# Patient Record
Sex: MALE | Race: WHITE | NOT HISPANIC OR LATINO | Employment: OTHER | URBAN - METROPOLITAN AREA
[De-identification: names, ages, dates, MRNs, and addresses within clinical notes are randomized per-mention and may not be internally consistent; named-entity substitution may affect disease eponyms.]

---

## 2017-01-17 ENCOUNTER — GENERIC CONVERSION - ENCOUNTER (OUTPATIENT)
Dept: OTHER | Facility: OTHER | Age: 62
End: 2017-01-17

## 2017-05-01 ENCOUNTER — ALLSCRIPTS OFFICE VISIT (OUTPATIENT)
Dept: OTHER | Facility: OTHER | Age: 62
End: 2017-05-01

## 2017-05-01 ENCOUNTER — LAB REQUISITION (OUTPATIENT)
Dept: LAB | Facility: HOSPITAL | Age: 62
End: 2017-05-01
Payer: COMMERCIAL

## 2017-05-01 DIAGNOSIS — Z12.5 ENCOUNTER FOR SCREENING FOR MALIGNANT NEOPLASM OF PROSTATE: ICD-10-CM

## 2017-05-01 LAB — PSA SERPL-MCNC: 0.9 NG/ML (ref 0–4)

## 2017-05-01 PROCEDURE — G0103 PSA SCREENING: HCPCS | Performed by: FAMILY MEDICINE

## 2017-05-03 ENCOUNTER — GENERIC CONVERSION - ENCOUNTER (OUTPATIENT)
Dept: OTHER | Facility: OTHER | Age: 62
End: 2017-05-03

## 2017-05-16 ENCOUNTER — GENERIC CONVERSION - ENCOUNTER (OUTPATIENT)
Dept: OTHER | Facility: OTHER | Age: 62
End: 2017-05-16

## 2018-01-11 NOTE — PROGRESS NOTES
Assessment    1  Preventative health care (V70 0) (Z00 00)   2  Encounter for screening for malignant neoplasm of prostate (V76 44) (Z12 5)    Plan  Encounter for screening for malignant neoplasm of prostate    · (1) PSA (SCREEN) (Dx V76 44 Screen for Prostate Cancer); Status:Active; Requested  for:01May2017;   Need for influenza vaccination    · Stop: Fluzone Quadrivalent Intramuscular Suspension    Discussion/Summary    Patient is a 50-year-old M  1   - well today  Reviewed health maintenance measures with him  He is up-to-date with colon cancer screening  He is also up-to-date with his immunizations  He states that he was in [place] 4 years ago and received all his vaccines  He was advised that he only would be due today for shingles vaccine  He was advised to check with his insurance  If acceptable, he may receive this vaccine in office  Follow-up in one year  Chief Complaint  Patient presents for Annual physical and blood work review  Patient brought blood results  History of Present Illness  , Adult Male: The patient is being seen for a health maintenance evaluation  The last health maintenance visit was 1 year(s) ago  Social History: Household members include spouse and adult children  He is   The patient has never smoked cigarettes  He reports drinking 2 drinks per day  He has never used illicit drugs  General Health: The patient's health since the last visit is described as good  He has regular dental visits  He denies vision problems  He denies hearing loss  Lifestyle:  He consumes a diverse and healthy diet  He has weight concerns  He exercises regularly  He does not use tobacco  He consumes alcohol  He denies drug use  Screening:      Review of Systems    Constitutional: not feeling poorly and not feeling tired  Eyes: no eyesight problems and no purulent discharge from the eyes  ENT: no nosebleeds and no nasal discharge     Cardiovascular: no chest pain and no palpitations  Respiratory: no cough and no shortness of breath during exertion  Gastrointestinal: no nausea and no diarrhea  Genitourinary: no dysuria and no nocturia  Musculoskeletal: no arthralgias and no myalgias  Integumentary: no rashes and no skin lesions  Neurological: no headache, no numbness and no dizziness  Psychiatric: no anxiety and no depression  Endocrine: no muscle weakness and no erectile dysfunction  Hematologic/Lymphatic: no tendency for easy bleeding and no tendency for easy bruising  Active Problems    1  Encounter for screening for malignant neoplasm of colon (V76 51) (Z12 11)   2  Essential thrombocytopenia (287 30) (D69 3)   3  Essential thrombocytosis (238 71) (D47 3)   4  Impacted cerumen of left ear (380 4) (H61 22)   5  Male erectile dysfunction (607 84) (N52 9)   6  Special screening examination for neoplasm of prostate (V76 44) (Z12 5)    Past Medical History    · History of Screening for lipoid disorders (F07 91) (R84 013)    Surgical History    · History of Cornea Transplant    Current Meds   1  Adult Aspirin EC Low Strength 81 MG Oral Tablet Delayed Release; TAKE 1 TABLET   DAILY; Therapy: [id number] to Recorded   2  Anagrelide HCl - 0 5 MG Oral Capsule; Take 1 capsule twice daily Recorded   3  Cialis 5 MG Oral Tablet; TAKE AS DIRECTED; Therapy: [id number] to (Last Rx:13Apr2016)  Requested for: 13Apr2016 Ordered   4  Fish Oil 1000 MG Oral Capsule; Therapy: 03AVX8983 to Recorded   5  Multivitamins Oral Capsule; Therapy: 63TZF6851 to Recorded   6  Probiata Oral Tablet; Therapy: 06INI5202 to Recorded    Allergies    1   No Known Drug Allergies    Vitals   Recorded: 54IXX9543 09:33AM   Temperature 95 8 F, Tympanic   Heart Rate 64   Pulse Quality Norm   Respiration Quality Norm   Respiration 14   Systolic 114, LUE, Sitting   Diastolic 70, LUE, Sitting   Height 5 ft 2 in   Weight 144 lb 6 08 oz   BMI Calculated 26 41   BSA Calculated 1 66   O2 Saturation 95, RA   Pain Scale 0     Physical Exam    Constitutional   General appearance: No acute distress, well appearing and well nourished  Head and Face   Head and face: Normal     Palpation of the face and sinuses: No sinus tenderness  Eyes   Conjunctiva and lids: No erythema, swelling or discharge  Pupils and irises: Equal, round, reactive to light  Ears, Nose, Mouth, and Throat   External inspection of ears and nose: Normal     Otoscopic examination: Tympanic membranes translucent with normal light reflex  Canals patent without erythema  Nasal mucosa, septum, and turbinates: Normal without edema or erythema  Lips, teeth, and gums: Normal, good dentition  Oropharynx: Normal with no erythema, edema, exudate or lesions  Neck   Neck: Supple, symmetric, trachea midline, no masses  Thyroid: Normal, no thyromegaly  Pulmonary   Respiratory effort: No increased work of breathing or signs of respiratory distress  Auscultation of lungs: Clear to auscultation  Cardiovascular   Auscultation of heart: Normal rate and rhythm, normal S1 and S2, no murmurs  Peripheral vascular exam: Normal     Examination of extremities for edema and/or varicosities: Normal     Abdomen   Abdomen: Non-tender, no masses  Liver and spleen: No hepatomegaly or splenomegaly  Lymphatic   Palpation of lymph nodes in neck: No lymphadenopathy  Palpation of lymph nodes in axillae: No lymphadenopathy  Musculoskeletal   Gait and station: Normal     Inspection/palpation of joints, bones, and muscles: Normal     Range of motion: Normal     Skin   Skin and subcutaneous tissue: Normal without rashes or lesions  Palpation of skin and subcutaneous tissue: Normal turgor  Neurologic   Cranial nerves: Cranial nerves 2-12 intact  Sensation: No sensory loss      Psychiatric   Judgment and insight: Normal     Orientation to person, place and time: Normal     Mood and affect: Normal        Results/Data  PHQ-9 Adult Depression Screening [id number] 09:39AM User, [facility]     Test Name Result Flag Reference   PHQ-9 Adult Depression Score 0     Over the last two weeks, how often have you been bothered by any of the following problems? Little interest or pleasure in doing things: Not at all - 0  Feeling down, depressed, or hopeless: Not at all - 0  Trouble falling or staying asleep, or sleeping too much: Not at all - 0  Feeling tired or having little energy: Not at all - 0  Poor appetite or over eating: Not at all - 0  Feeling bad about yourself - or that you are a failure or have let yourself or your family down: Not at all - 0  Trouble concentrating on things, such as reading the newspaper or watching television: Not at all - 0  Moving or speaking so slowly that other people could have noticed  Or the opposite -  being so fidgety or restless that you have been moving around a lot more than usual: Not at all - 0  Thoughts that you would be better off dead, or of hurting yourself in some way: Not at all - 0   PHQ-9 Adult Depression Screening Negative     PHQ-9 Difficulty Level Not difficult at all     PHQ-9 Severity No Depression         Health Management  Encounter for screening for malignant neoplasm of colon   COLONOSCOPY; every 5 years; Last [id number]; Next Due: 53UKB7823; Active    Signatures   Electronically signed by :  Mazin Tan DO; May  1 2017 10:10AM EST                       (Author)

## 2018-01-12 VITALS
HEART RATE: 64 BPM | OXYGEN SATURATION: 95 % | WEIGHT: 144.38 LBS | SYSTOLIC BLOOD PRESSURE: 122 MMHG | BODY MASS INDEX: 26.57 KG/M2 | DIASTOLIC BLOOD PRESSURE: 70 MMHG | TEMPERATURE: 95.8 F | HEIGHT: 62 IN | RESPIRATION RATE: 14 BRPM

## 2018-01-15 NOTE — RESULT NOTES
Verified Results  (1) PSA (SCREEN) (Dx V76 44 Screen for Prostate Cancer) [id number] 10:13AM Ata Haelodiaing Order Number: [id number]     Test Name Result Flag Reference   PROSTATE SPECIFIC ANTIGEN 0 9 ng/mL  0 0-4 0   American [employer] Association Guidelines define biochemical recurrence of prostate cancer as a detectable or rising PSA value post-radical prostatectomy that is greater than or equal to 0 2 ng/mL with a second confirmatory level of greater than or equal to 0 2 ng/mL

## 2018-03-10 DIAGNOSIS — N52.9 ERECTILE DYSFUNCTION, UNSPECIFIED ERECTILE DYSFUNCTION TYPE: ICD-10-CM

## 2018-03-10 DIAGNOSIS — D69.3 ESSENTIAL THROMBOCYTOPENIA (HCC): Primary | ICD-10-CM

## 2018-03-10 NOTE — TELEPHONE ENCOUNTER
Patient called asking for a refill on his Cialis but he is requesting  20 mg instead of 5 mg, if approved please sent to mail order  Thank you

## 2018-03-10 NOTE — TELEPHONE ENCOUNTER
Please call patient, these different doses are used for different medical problems  The if he would like to switch the dose, he must be seen and evaluated for a reason why  Please schedule him    Thank you

## 2018-03-12 RX ORDER — TADALAFIL 5 MG/1
TABLET ORAL
COMMUNITY
Start: 2014-07-29 | End: 2018-03-12 | Stop reason: SDUPTHER

## 2018-03-12 RX ORDER — TADALAFIL 5 MG/1
5 TABLET ORAL DAILY
Qty: 90 TABLET | Refills: 3 | Status: SHIPPED | OUTPATIENT
Start: 2018-03-12 | End: 2019-05-06 | Stop reason: SDUPTHER

## 2018-03-12 NOTE — TELEPHONE ENCOUNTER
PT STATED THAT THE 5 MG IS FINE AND IF HE WANTS AN INCREASE HE WILL SCHEDULE AN APPT  PLEASE SEND THIS TO [facility]    HE STATED THAT THEY ALLOW 24 PILLS FOR A 90 DAY SUPPLY

## 2018-03-12 NOTE — TELEPHONE ENCOUNTER
Please check [employer] to confirm his previous dose of this medication  Please place order for this medication with proper quantity  I will authorize at that point   Thank you

## 2018-07-02 ENCOUNTER — OFFICE VISIT (OUTPATIENT)
Dept: FAMILY MEDICINE CLINIC | Facility: CLINIC | Age: 63
End: 2018-07-02
Payer: COMMERCIAL

## 2018-07-02 VITALS
RESPIRATION RATE: 16 BRPM | OXYGEN SATURATION: 97 % | TEMPERATURE: 96 F | WEIGHT: 146.7 LBS | SYSTOLIC BLOOD PRESSURE: 146 MMHG | HEART RATE: 85 BPM | DIASTOLIC BLOOD PRESSURE: 60 MMHG | BODY MASS INDEX: 27.7 KG/M2 | HEIGHT: 61 IN

## 2018-07-02 DIAGNOSIS — Z11.59 NEED FOR HEPATITIS C SCREENING TEST: ICD-10-CM

## 2018-07-02 DIAGNOSIS — Z00.00 PREVENTATIVE HEALTH CARE: Primary | ICD-10-CM

## 2018-07-02 DIAGNOSIS — Z13.220 SCREENING FOR CHOLESTEROL LEVEL: ICD-10-CM

## 2018-07-02 PROCEDURE — 99396 PREV VISIT EST AGE 40-64: CPT | Performed by: FAMILY MEDICINE

## 2018-07-02 RX ORDER — ANAGRELIDE 0.5 MG/1
1 CAPSULE ORAL 2 TIMES DAILY
COMMUNITY

## 2018-07-02 RX ORDER — ASPIRIN 81 MG/1
1 TABLET ORAL DAILY
COMMUNITY
Start: 2017-05-01

## 2018-07-02 RX ORDER — CHLORAL HYDRATE 500 MG
CAPSULE ORAL
COMMUNITY
Start: 2017-05-01

## 2018-07-02 NOTE — PROGRESS NOTES
Assessment/Plan:   1  Preventative health care  Patient appears well today  Reviewed health maintenance measures within  He is up-to-date with his colon cancer screening  He does have blood work checked regularly by his hematologist   Will check lipid panel as well as hepatitis C antibody  He was encouraged to continue with his regular exercise it appears that he is up-to-date with his vaccines  [facility]  He will attempt to request these records  Will follow up in 1 year or p r n  Joe Holleyuce - Lipid Panel with Direct LDL reflex; Future    2  Screening for cholesterol level  - Lipid Panel with Direct LDL reflex; Future    3  Need for hepatitis C screening test  - Hepatitis C antibody; Future     There are no diagnoses linked to this encounter  Subjective:    Chief Complaint   Patient presents with    Well Check        Patient ID: Maren Buitrago is a 60 y o  male  Maren Buitrago presents for health maintenance visit   60 y o   male    He/she states that  level of health is:  good     Dental issues :no    Vision issues: no    Hearing issues: no    Up-to-date on immunizations: no    Diet: good     Exercise:  daily    Tobacco: no    ETOH: Moderate    Illegal drugs: no          Review of Systems   Constitutional: Negative for activity change, chills, fatigue and fever  HENT: Negative for congestion, ear pain, sinus pressure and sore throat  Eyes: Negative for redness, itching and visual disturbance  Respiratory: Negative for cough and shortness of breath  Cardiovascular: Negative for chest pain and palpitations  Gastrointestinal: Negative for abdominal pain, diarrhea and nausea  Endocrine: Negative for cold intolerance and heat intolerance  Genitourinary: Negative for dysuria, flank pain and frequency  Musculoskeletal: Negative for arthralgias, back pain, gait problem and myalgias  Skin: Negative for color change     Allergic/Immunologic: Negative for environmental allergies  Neurological: Negative for dizziness, numbness and headaches  Psychiatric/Behavioral: Negative for behavioral problems and sleep disturbance  The following portions of the patient's history were reviewed and updated as appropriate : past family history, past medical history, past social history and past surgical history  Current Outpatient Prescriptions:     anagrelide (AGRYLIN) 0 5 MG capsule, Take 1 capsule by mouth 2 (two) times a day, Disp: , Rfl:     aspirin (ADULT ASPIRIN EC LOW STRENGTH) 81 mg EC tablet, Take 1 tablet by mouth daily, Disp: , Rfl:     Multiple Vitamin (MULTIVITAMINS PO), Take by mouth, Disp: , Rfl:     Omega-3 Fatty Acids (FISH OIL) 1,000 mg, Take by mouth, Disp: , Rfl:     tadalafil (CIALIS) 5 MG tablet, Take 1 tablet (5 mg total) by mouth daily, Disp: 90 tablet, Rfl: 3    Objective:    Vitals:    07/02/18 0924   BP: 146/60   BP Location: Left arm   Patient Position: Sitting   Cuff Size: Adult   Pulse: 85   Resp: 16   Temp: (!) 96 °F (35 6 °C)   TempSrc: Tympanic   SpO2: 97%   Weight: 66 5 kg (146 lb 11 2 oz)   Height: 5' 1" (1 549 m)        Physical Exam   Constitutional: He is oriented to person, place, and time  He appears well-developed and well-nourished  HENT:   Head: Normocephalic and atraumatic  Nose: Nose normal    Mouth/Throat: No oropharyngeal exudate  Eyes: Pupils are equal, round, and reactive to light  Right eye exhibits no discharge  Left eye exhibits no discharge  Neck: Normal range of motion  Neck supple  No tracheal deviation present  Cardiovascular: Normal rate, regular rhythm and intact distal pulses  Exam reveals no gallop and no friction rub  No murmur heard  Pulses:       Dorsalis pedis pulses are 2+ on the right side, and 2+ on the left side  Posterior tibial pulses are 2+ on the right side, and 2+ on the left side     Pulmonary/Chest: Effort normal and breath sounds normal  No respiratory distress  He has no wheezes  He has no rales  Abdominal: Soft  Bowel sounds are normal  He exhibits no distension  There is no tenderness  There is no rebound and no guarding  Musculoskeletal: Normal range of motion  He exhibits no edema  Lymphadenopathy:        Head (right side): No submental and no submandibular adenopathy present  Head (left side): No submental and no submandibular adenopathy present  He has no cervical adenopathy  Right cervical: No superficial cervical, no deep cervical and no posterior cervical adenopathy present  Left cervical: No superficial cervical, no deep cervical and no posterior cervical adenopathy present  Neurological: He is alert and oriented to person, place, and time  No cranial nerve deficit or sensory deficit  Skin: Skin is warm, dry and intact  Psychiatric: His speech is normal and behavior is normal  Judgment normal  His mood appears not anxious  Cognition and memory are normal  He does not exhibit a depressed mood  Vitals reviewed

## 2018-09-24 ENCOUNTER — TELEPHONE (OUTPATIENT)
Dept: FAMILY MEDICINE CLINIC | Facility: CLINIC | Age: 63
End: 2018-09-24

## 2018-09-24 NOTE — TELEPHONE ENCOUNTER
----- Message from Yelenahomer Jeffrey sent at 9/24/2018 12:55 PM EDT -----  Regarding: Visit Follow-Up Question  Contact: [phone]  Dr Shaun Aguilar wanted to follow up on a few things  I tried to [facility]  How was the LDL level  Generally my total is high but due to high HDL  Also while I was there I looked at last years PSA test  I was told is was very low but what I saw was 0 9  I thought above 0 2 was something to follow up on  Is the number correct  ??   Mode Francois

## 2018-09-26 NOTE — TELEPHONE ENCOUNTER
Please see result note  As far as his PSA, last was 0 9  Levels above 2 0 should be followed much more closely    Thank you

## 2018-11-03 ENCOUNTER — IMMUNIZATION (OUTPATIENT)
Dept: FAMILY MEDICINE CLINIC | Facility: CLINIC | Age: 63
End: 2018-11-03
Payer: COMMERCIAL

## 2018-11-03 DIAGNOSIS — Z23 ENCOUNTER FOR IMMUNIZATION: ICD-10-CM

## 2018-11-03 PROCEDURE — 90682 RIV4 VACC RECOMBINANT DNA IM: CPT

## 2018-11-03 PROCEDURE — 90471 IMMUNIZATION ADMIN: CPT

## 2019-04-16 ENCOUNTER — OFFICE VISIT (OUTPATIENT)
Dept: FAMILY MEDICINE CLINIC | Facility: CLINIC | Age: 64
End: 2019-04-16
Payer: COMMERCIAL

## 2019-04-16 VITALS
WEIGHT: 147 LBS | DIASTOLIC BLOOD PRESSURE: 76 MMHG | TEMPERATURE: 98.3 F | HEIGHT: 61 IN | BODY MASS INDEX: 27.75 KG/M2 | OXYGEN SATURATION: 98 % | SYSTOLIC BLOOD PRESSURE: 110 MMHG | HEART RATE: 98 BPM

## 2019-04-16 DIAGNOSIS — J01.90 ACUTE SINUSITIS, RECURRENCE NOT SPECIFIED, UNSPECIFIED LOCATION: Primary | ICD-10-CM

## 2019-04-16 PROCEDURE — 3008F BODY MASS INDEX DOCD: CPT | Performed by: FAMILY MEDICINE

## 2019-04-16 PROCEDURE — 99214 OFFICE O/P EST MOD 30 MIN: CPT | Performed by: FAMILY MEDICINE

## 2019-04-16 PROCEDURE — 69209 REMOVE IMPACTED EAR WAX UNI: CPT | Performed by: FAMILY MEDICINE

## 2019-04-16 RX ORDER — LEVOFLOXACIN 500 MG/1
500 TABLET, FILM COATED ORAL EVERY 24 HOURS
Qty: 7 TABLET | Refills: 0 | Status: SHIPPED | OUTPATIENT
Start: 2019-04-16 | End: 2019-04-23

## 2019-05-06 DIAGNOSIS — N52.9 ERECTILE DYSFUNCTION, UNSPECIFIED ERECTILE DYSFUNCTION TYPE: ICD-10-CM

## 2019-05-06 DIAGNOSIS — D69.3 ESSENTIAL THROMBOCYTOPENIA (HCC): ICD-10-CM

## 2019-05-07 RX ORDER — TADALAFIL 5 MG/1
TABLET ORAL
Qty: 24 TABLET | Refills: 12 | Status: SHIPPED | OUTPATIENT
Start: 2019-05-07

## 2020-09-02 ENCOUNTER — DOCTOR'S OFFICE (OUTPATIENT)
Dept: URBAN - METROPOLITAN AREA CLINIC 153 | Facility: CLINIC | Age: 65
Setting detail: OPHTHALMOLOGY
End: 2020-09-02
Payer: COMMERCIAL

## 2020-09-02 DIAGNOSIS — H52.4: ICD-10-CM

## 2020-09-02 DIAGNOSIS — Z94.7: ICD-10-CM

## 2020-09-02 DIAGNOSIS — H25.13: ICD-10-CM

## 2020-09-02 PROBLEM — H52.13 MYOPIA; BOTH EYES: Status: ACTIVE | Noted: 2020-09-02

## 2020-09-02 PROCEDURE — 92015 DETERMINE REFRACTIVE STATE: CPT | Performed by: OPTOMETRIST

## 2020-09-02 PROCEDURE — 92004 COMPRE OPH EXAM NEW PT 1/>: CPT | Performed by: OPTOMETRIST

## 2020-09-02 ASSESSMENT — REFRACTION_MANIFEST
OD_ADD: +2.25
OD_VA1: 20/150
OD_VA1: 20/70+2
OD_CYLINDER: -6.00
OD_AXIS: 095
OS_AXIS: 085
OS_CYLINDER: -0.75
OS_SPHERE: -6.00
OD_SPHERE: -5.75
OS_VA1: 20/25
OD_CYLINDER: SPH
OS_SPHERE: -6.00
OS_AXIS: 085
OS_ADD: +2.25
OD_SPHERE: -5.75
OU_VA: 20/25
OS_CYLINDER: -0.75
OS_VA1: 20/25

## 2020-09-02 ASSESSMENT — REFRACTION_CURRENTRX
OS_VPRISM_DIRECTION: SV
OD_OVR_VA: 20/
OS_CYLINDER: -1.00
OD_AXIS: 000
OS_ADD: +2.25
OD_VPRISM_DIRECTION: SV
OS_AXIS: 90
OS_OVR_VA: 20/
OD_CYLINDER: 0.00
OD_SPHERE: -5.75
OD_ADD: +2.25
OS_SPHERE: -6.00

## 2020-09-02 ASSESSMENT — REFRACTION_AUTOREFRACTION
OD_CYLINDER: -8.75
OS_CYLINDER: -0.75
OS_SPHERE: -6.00
OS_AXIS: 105
OD_SPHERE: -4.25
OD_AXIS: 095

## 2020-09-02 ASSESSMENT — CONFRONTATIONAL VISUAL FIELD TEST (CVF)
OS_FINDINGS: FULL
OD_FINDINGS: FULL

## 2020-09-02 ASSESSMENT — SPHEQUIV_DERIVED
OS_SPHEQUIV: -6.375
OD_SPHEQUIV: -8.625
OS_SPHEQUIV: -6.375
OS_SPHEQUIV: -6.375
OD_SPHEQUIV: -8.75

## 2020-09-02 ASSESSMENT — VISUAL ACUITY
OS_BCVA: 20/150
OD_BCVA: 20/30

## 2020-09-03 ENCOUNTER — OPTICAL OFFICE (OUTPATIENT)
Dept: URBAN - METROPOLITAN AREA CLINIC 154 | Facility: CLINIC | Age: 65
Setting detail: OPHTHALMOLOGY
End: 2020-09-03

## 2020-09-03 DIAGNOSIS — H52.13: ICD-10-CM

## 2020-09-03 PROCEDURE — V2781 PROGRESSIVE LENS PER LENS: HCPCS | Performed by: OPTOMETRIST

## 2020-09-03 PROCEDURE — V2799 MISC VISION ITEM OR SERVICE: HCPCS | Performed by: OPTOMETRIST

## 2020-09-03 PROCEDURE — V2783 LENS, >= 1.66 P/>=1.80 G: HCPCS | Performed by: OPTOMETRIST

## 2020-09-03 PROCEDURE — V2020 VISION SVCS FRAMES PURCHASES: HCPCS | Performed by: OPTOMETRIST

## 2020-10-21 ENCOUNTER — OPTICAL OFFICE (OUTPATIENT)
Dept: URBAN - METROPOLITAN AREA CLINIC 143 | Facility: CLINIC | Age: 65
Setting detail: OPHTHALMOLOGY
End: 2020-10-21

## 2020-10-21 DIAGNOSIS — H52.13: ICD-10-CM

## 2020-10-21 PROCEDURE — V2020 VISION SVCS FRAMES PURCHASES: HCPCS | Performed by: OPTOMETRIST

## 2021-03-10 DIAGNOSIS — Z23 ENCOUNTER FOR IMMUNIZATION: ICD-10-CM
